# Patient Record
Sex: FEMALE | Race: WHITE | NOT HISPANIC OR LATINO | URBAN - METROPOLITAN AREA
[De-identification: names, ages, dates, MRNs, and addresses within clinical notes are randomized per-mention and may not be internally consistent; named-entity substitution may affect disease eponyms.]

---

## 2018-09-11 PROBLEM — 414916001 OBESITY: Status: ACTIVE | Noted: 2018-09-11

## 2018-09-11 PROBLEM — 313436004 TYPE II DIABETES MELLITUS WITHOUT COMPLICATION: Status: ACTIVE | Noted: 2018-09-11

## 2018-09-11 PROBLEM — 55570000 ASTHMA WITHOUT STATUS ASTHMATICUS: Status: ACTIVE | Noted: 2018-09-11

## 2018-09-11 PROBLEM — 428283002 HISTORY OF POLYP OF COLON (SITUATION): Status: ACTIVE | Noted: 2018-09-11

## 2018-09-11 PROBLEM — 301754002 RIGHT LOWER QUADRANT PAIN: Status: ACTIVE | Noted: 2018-09-11

## 2022-04-30 ENCOUNTER — TELEPHONE ENCOUNTER (OUTPATIENT)
Dept: URBAN - METROPOLITAN AREA CLINIC 121 | Facility: CLINIC | Age: 72
End: 2022-04-30

## 2022-05-01 ENCOUNTER — TELEPHONE ENCOUNTER (OUTPATIENT)
Dept: URBAN - METROPOLITAN AREA CLINIC 121 | Facility: CLINIC | Age: 72
End: 2022-05-01

## 2022-05-01 RX ORDER — METHAZOLAMIDE 25 MG/1
TABLET ORAL
Status: ACTIVE | COMMUNITY
Start: 2018-09-11

## 2022-05-01 RX ORDER — BIMATOPROST 0.1 MG/ML
SOLUTION/ DROPS OPHTHALMIC
Status: ACTIVE | COMMUNITY
Start: 2018-09-11

## 2022-05-01 RX ORDER — OMEGA-3S/DHA/EPA/FISH OIL 980-1400MG
CAPSULE,DELAYED RELEASE (ENTERIC COATED) ORAL
Status: ACTIVE | COMMUNITY
Start: 2018-09-11

## 2022-05-01 RX ORDER — DORZOLAMIDE HCL/TIMOLOL MALEAT 22.3-6.8/1
DROPS OPHTHALMIC (EYE)
Status: ACTIVE | COMMUNITY
Start: 2018-09-11

## 2022-05-01 RX ORDER — VENLAFAXINE 25 MG/1
TABLET ORAL
Status: ACTIVE | COMMUNITY
Start: 2018-09-11

## 2022-05-01 RX ORDER — METFORMIN HCL 500 MG/1
TABLET ORAL
Status: ACTIVE | COMMUNITY
Start: 2018-09-11

## 2022-05-01 RX ORDER — ESOMEPRAZOLE MAGNESIUM 20 MG
CAPSULE,DELAYED RELEASE (ENTERIC COATED) ORAL
Status: ACTIVE | COMMUNITY
Start: 2018-09-11

## 2022-05-01 RX ORDER — ESOMEPRAZOLE STRONTIUM 49.3 MG/1
CAPSULE, DELAYED RELEASE ORAL
Status: ACTIVE | COMMUNITY
Start: 2018-09-11

## 2022-05-01 RX ORDER — FENOFIBRATE 50 MG/1
CAPSULE ORAL
Status: ACTIVE | COMMUNITY
Start: 2018-09-11

## 2022-05-01 RX ORDER — TEA TREE OIL 100 %
SPRAY, NON-AEROSOL (ML) TOPICAL
Status: ACTIVE | COMMUNITY
Start: 2018-09-11

## 2022-05-01 RX ORDER — ATORVASTATIN CALCIUM 10 MG/1
TABLET, FILM COATED ORAL
Status: ACTIVE | COMMUNITY
Start: 2018-09-11

## 2024-12-02 ENCOUNTER — DASHBOARD ENCOUNTERS (OUTPATIENT)
Age: 74
End: 2024-12-02

## 2024-12-02 ENCOUNTER — LAB OUTSIDE AN ENCOUNTER (OUTPATIENT)
Dept: URBAN - METROPOLITAN AREA CLINIC 27 | Facility: CLINIC | Age: 74
End: 2024-12-02

## 2024-12-02 ENCOUNTER — OFFICE VISIT (OUTPATIENT)
Dept: URBAN - METROPOLITAN AREA CLINIC 27 | Facility: CLINIC | Age: 74
End: 2024-12-02
Payer: COMMERCIAL

## 2024-12-02 ENCOUNTER — TELEPHONE ENCOUNTER (OUTPATIENT)
Dept: URBAN - METROPOLITAN AREA CLINIC 27 | Facility: CLINIC | Age: 74
End: 2024-12-02

## 2024-12-02 VITALS
HEART RATE: 120 BPM | SYSTOLIC BLOOD PRESSURE: 98 MMHG | DIASTOLIC BLOOD PRESSURE: 77 MMHG | HEIGHT: 62 IN | BODY MASS INDEX: 30.36 KG/M2 | WEIGHT: 165 LBS

## 2024-12-02 DIAGNOSIS — K62.5 RECTAL BLEEDING: ICD-10-CM

## 2024-12-02 PROCEDURE — 99214 OFFICE O/P EST MOD 30 MIN: CPT | Performed by: INTERNAL MEDICINE

## 2024-12-02 RX ORDER — METFORMIN HCL 500 MG/1
TABLET ORAL
Status: ACTIVE | COMMUNITY
Start: 2018-09-11

## 2024-12-02 RX ORDER — ATORVASTATIN CALCIUM 10 MG/1
TABLET, FILM COATED ORAL
Status: ACTIVE | COMMUNITY
Start: 2018-09-11

## 2024-12-02 RX ORDER — BIMATOPROST 0.1 MG/ML
SOLUTION/ DROPS OPHTHALMIC
Status: ACTIVE | COMMUNITY
Start: 2018-09-11

## 2024-12-02 RX ORDER — TEA TREE OIL 100 %
SPRAY, NON-AEROSOL (ML) TOPICAL
Status: ACTIVE | COMMUNITY
Start: 2018-09-11

## 2024-12-02 RX ORDER — FENOFIBRATE 50 MG/1
CAPSULE ORAL
Status: ACTIVE | COMMUNITY
Start: 2018-09-11

## 2024-12-02 RX ORDER — VENLAFAXINE 25 MG/1
TABLET ORAL
Status: ACTIVE | COMMUNITY
Start: 2018-09-11

## 2024-12-02 RX ORDER — OMEGA-3S/DHA/EPA/FISH OIL 980-1400MG
CAPSULE,DELAYED RELEASE (ENTERIC COATED) ORAL
Status: ACTIVE | COMMUNITY
Start: 2018-09-11

## 2024-12-02 RX ORDER — ESOMEPRAZOLE STRONTIUM 49.3 MG/1
CAPSULE, DELAYED RELEASE ORAL
Status: ACTIVE | COMMUNITY
Start: 2018-09-11

## 2024-12-02 RX ORDER — METHAZOLAMIDE 25 MG/1
TABLET ORAL
Status: ACTIVE | COMMUNITY
Start: 2018-09-11

## 2024-12-02 RX ORDER — DORZOLAMIDE HCL/TIMOLOL MALEAT 22.3-6.8/1
DROPS OPHTHALMIC (EYE)
Status: ACTIVE | COMMUNITY
Start: 2018-09-11

## 2024-12-02 RX ORDER — ESOMEPRAZOLE MAGNESIUM 20 MG/1
CAPSULE, DELAYED RELEASE ORAL
Status: ACTIVE | COMMUNITY
Start: 2018-09-11

## 2024-12-02 NOTE — HPI-TODAY'S VISIT:
75 yo female here for rectal bleeding. Bleeding started about 5 days ago. About one week prior to bleeding, had constipation and straining. Blood is spontaneously on the underwear, pad, not mixed in with the stool. She does have some crampy lower abdominal pain as well. Never had anything like this before. Last colonoscopy was 2018 that showed one polyp with recommendations to repeat in 5 years.

## 2024-12-03 LAB
A/G RATIO: 1.7
ABSOLUTE BASOPHILS: 80
ABSOLUTE EOSINOPHILS: 181
ABSOLUTE LYMPHOCYTES: 2271
ABSOLUTE MONOCYTES: 1588
ABSOLUTE NEUTROPHILS: (no result)
ALBUMIN: 4.6
ALKALINE PHOSPHATASE: 63
ALT (SGPT): 5
AST (SGOT): 13
BASOPHILS: 0.4
BILIRUBIN, TOTAL: 0.5
BUN/CREATININE RATIO: 24
BUN: 24
CALCIUM: 10.4
CARBON DIOXIDE, TOTAL: 20
CHLORIDE: 103
CREATININE: 1.02
EGFR: 58
EOSINOPHILS: 0.9
GLOBULIN, TOTAL: 2.7
GLUCOSE: 147
HEMATOCRIT: 42.4
HEMOGLOBIN: 13.7
LYMPHOCYTES: 11.3
MCH: 30
MCHC: 32.3
MCV: 93
MONOCYTES: 7.9
MPV: 9.5
NEUTROPHILS: 79.5
PLATELET COUNT: 397
POTASSIUM: 4.3
PROTEIN, TOTAL: 7.3
RDW: 12.3
RED BLOOD CELL COUNT: 4.56
SODIUM: 140
WHITE BLOOD CELL COUNT: 20.1

## 2024-12-06 ENCOUNTER — LAB OUTSIDE AN ENCOUNTER (OUTPATIENT)
Dept: URBAN - METROPOLITAN AREA CLINIC 27 | Facility: CLINIC | Age: 74
End: 2024-12-06

## 2024-12-06 ENCOUNTER — CLAIMS CREATED FROM THE CLAIM WINDOW (OUTPATIENT)
Dept: URBAN - METROPOLITAN AREA SURGERY CENTER 7 | Facility: SURGERY CENTER | Age: 74
End: 2024-12-06
Payer: COMMERCIAL

## 2024-12-06 ENCOUNTER — CLAIMS CREATED FROM THE CLAIM WINDOW (OUTPATIENT)
Dept: URBAN - METROPOLITAN AREA CLINIC 4 | Facility: CLINIC | Age: 74
End: 2024-12-06
Payer: COMMERCIAL

## 2024-12-06 DIAGNOSIS — K64.1 BLEEDING GRADE II HEMORRHOIDS: ICD-10-CM

## 2024-12-06 DIAGNOSIS — K63.3 ULCER OF INTESTINE: ICD-10-CM

## 2024-12-06 DIAGNOSIS — K62.5 HEMORRHAGE OF ANUS AND RECTUM: ICD-10-CM

## 2024-12-06 DIAGNOSIS — K63.89 OTHER SPECIFIED DISEASES OF INTESTINE: ICD-10-CM

## 2024-12-06 DIAGNOSIS — K57.30 ACQUIRED DIVERTICULOSIS OF COLON: ICD-10-CM

## 2024-12-06 PROCEDURE — 45380 COLONOSCOPY AND BIOPSY: CPT | Performed by: INTERNAL MEDICINE

## 2024-12-06 PROCEDURE — 88305 TISSUE EXAM BY PATHOLOGIST: CPT | Performed by: PATHOLOGY

## 2024-12-06 PROCEDURE — 88341 IMHCHEM/IMCYTCHM EA ADD ANTB: CPT | Performed by: PATHOLOGY

## 2024-12-06 PROCEDURE — 88342 IMHCHEM/IMCYTCHM 1ST ANTB: CPT | Performed by: PATHOLOGY

## 2024-12-06 PROCEDURE — 00811 ANES LWR INTST NDSC NOS: CPT | Performed by: NURSE ANESTHETIST, CERTIFIED REGISTERED

## 2024-12-06 RX ORDER — ATORVASTATIN CALCIUM 10 MG/1
TABLET, FILM COATED ORAL
Status: ACTIVE | COMMUNITY
Start: 2018-09-11

## 2024-12-06 RX ORDER — DORZOLAMIDE HCL/TIMOLOL MALEAT 22.3-6.8/1
DROPS OPHTHALMIC (EYE)
Status: ACTIVE | COMMUNITY
Start: 2018-09-11

## 2024-12-06 RX ORDER — TEA TREE OIL 100 %
SPRAY, NON-AEROSOL (ML) TOPICAL
Status: ACTIVE | COMMUNITY
Start: 2018-09-11

## 2024-12-06 RX ORDER — ESOMEPRAZOLE MAGNESIUM 20 MG/1
CAPSULE, DELAYED RELEASE ORAL
Status: ACTIVE | COMMUNITY
Start: 2018-09-11

## 2024-12-06 RX ORDER — ESOMEPRAZOLE STRONTIUM 49.3 MG/1
CAPSULE, DELAYED RELEASE ORAL
Status: ACTIVE | COMMUNITY
Start: 2018-09-11

## 2024-12-06 RX ORDER — METFORMIN HCL 500 MG/1
TABLET ORAL
Status: ACTIVE | COMMUNITY
Start: 2018-09-11

## 2024-12-06 RX ORDER — FENOFIBRATE 50 MG/1
CAPSULE ORAL
Status: ACTIVE | COMMUNITY
Start: 2018-09-11

## 2024-12-06 RX ORDER — BIMATOPROST 0.1 MG/ML
SOLUTION/ DROPS OPHTHALMIC
Status: ACTIVE | COMMUNITY
Start: 2018-09-11

## 2024-12-06 RX ORDER — METHAZOLAMIDE 25 MG/1
TABLET ORAL
Status: ACTIVE | COMMUNITY
Start: 2018-09-11

## 2024-12-06 RX ORDER — OMEGA-3S/DHA/EPA/FISH OIL 980-1400MG
CAPSULE,DELAYED RELEASE (ENTERIC COATED) ORAL
Status: ACTIVE | COMMUNITY
Start: 2018-09-11

## 2024-12-06 RX ORDER — VENLAFAXINE 25 MG/1
TABLET ORAL
Status: ACTIVE | COMMUNITY
Start: 2018-09-11

## 2024-12-11 LAB
CLOSTRIDIUM DIFFICILE: (no result)
OVA AND PARASITES, CONC AND PERM SMEAR: (no result)
SALMONELLA AND SHIGELLA, CULTURE: (no result)
SHIGA TOXINS, EIA W/RFL TO E.COLI O157 CULTURE: (no result)

## 2024-12-12 ENCOUNTER — TELEPHONE ENCOUNTER (OUTPATIENT)
Dept: URBAN - METROPOLITAN AREA CLINIC 27 | Facility: CLINIC | Age: 74
End: 2024-12-12